# Patient Record
Sex: FEMALE | Race: WHITE | NOT HISPANIC OR LATINO | Employment: UNEMPLOYED | URBAN - METROPOLITAN AREA
[De-identification: names, ages, dates, MRNs, and addresses within clinical notes are randomized per-mention and may not be internally consistent; named-entity substitution may affect disease eponyms.]

---

## 2017-10-24 ENCOUNTER — CONVERSION ENCOUNTER (OUTPATIENT)
Dept: GENERAL RADIOLOGY | Facility: HOSPITAL | Age: 62
End: 2017-10-24

## 2018-05-23 ENCOUNTER — OFFICE VISIT CONVERTED (OUTPATIENT)
Dept: ORTHOPEDIC SURGERY | Facility: CLINIC | Age: 63
End: 2018-05-23
Attending: ORTHOPAEDIC SURGERY

## 2018-07-25 ENCOUNTER — OFFICE VISIT CONVERTED (OUTPATIENT)
Dept: ORTHOPEDIC SURGERY | Facility: CLINIC | Age: 63
End: 2018-07-25
Attending: ORTHOPAEDIC SURGERY

## 2019-03-06 ENCOUNTER — HOSPITAL ENCOUNTER (OUTPATIENT)
Dept: GENERAL RADIOLOGY | Facility: HOSPITAL | Age: 64
Discharge: HOME | End: 2019-03-06
Attending: FAMILY MEDICINE

## 2019-11-25 ENCOUNTER — HOSPITAL ENCOUNTER (OUTPATIENT)
Dept: URGENT CARE | Facility: CLINIC | Age: 64
Discharge: HOME | End: 2019-11-25

## 2020-03-04 ENCOUNTER — HOSPITAL ENCOUNTER (OUTPATIENT)
Dept: GENERAL RADIOLOGY | Facility: HOSPITAL | Age: 65
Discharge: HOME | End: 2020-03-04
Attending: FAMILY MEDICINE

## 2020-06-30 ENCOUNTER — HOSPITAL ENCOUNTER (OUTPATIENT)
Dept: LAB | Facility: HOSPITAL | Age: 65
Discharge: HOME | End: 2020-06-30
Attending: FAMILY MEDICINE

## 2020-06-30 LAB
ALBUMIN SERPL-MCNC: 4.4 G/DL (ref 3.5–5)
ALBUMIN/GLOB SERPL: 2 {RATIO} (ref 1.4–2.6)
ALP SERPL-CCNC: 60 U/L (ref 43–160)
ALT SERPL-CCNC: 25 U/L (ref 10–40)
ANION GAP SERPL CALC-SCNC: 19 MMOL/L (ref 8–19)
AST SERPL-CCNC: 14 U/L (ref 15–50)
BASOPHILS # BLD AUTO: 0.02 10*3/UL (ref 0–0.2)
BASOPHILS NFR BLD AUTO: 0.4 % (ref 0–3)
BILIRUB SERPL-MCNC: 0.44 MG/DL (ref 0.2–1.3)
BUN SERPL-MCNC: 12 MG/DL (ref 5–25)
BUN/CREAT SERPL: 21 {RATIO} (ref 6–20)
CALCIUM SERPL-MCNC: 9.2 MG/DL (ref 8.7–10.4)
CHLORIDE SERPL-SCNC: 98 MMOL/L (ref 99–111)
CK SERPL-CCNC: 78 U/L (ref 35–230)
CONV ABS IMM GRAN: 0.01 10*3/UL (ref 0–0.2)
CONV CO2: 24 MMOL/L (ref 22–32)
CONV IMMATURE GRAN: 0.2 % (ref 0–1.8)
CONV TOTAL PROTEIN: 6.6 G/DL (ref 6.3–8.2)
CREAT UR-MCNC: 0.56 MG/DL (ref 0.5–0.9)
DEPRECATED RDW RBC AUTO: 41.2 FL (ref 36.4–46.3)
EOSINOPHIL # BLD AUTO: 0.07 10*3/UL (ref 0–0.7)
EOSINOPHIL # BLD AUTO: 1.4 % (ref 0–7)
ERYTHROCYTE [DISTWIDTH] IN BLOOD BY AUTOMATED COUNT: 12 % (ref 11.7–14.4)
GFR SERPLBLD BASED ON 1.73 SQ M-ARVRAT: >60 ML/MIN/{1.73_M2}
GLOBULIN UR ELPH-MCNC: 2.2 G/DL (ref 2–3.5)
GLUCOSE SERPL-MCNC: 95 MG/DL (ref 65–99)
HCT VFR BLD AUTO: 40.2 % (ref 37–47)
HGB BLD-MCNC: 12.9 G/DL (ref 12–16)
INR PPP: 0.95 (ref 2–3)
LYMPHOCYTES # BLD AUTO: 1.58 10*3/UL (ref 1–5)
LYMPHOCYTES NFR BLD AUTO: 32.6 % (ref 20–45)
MAGNESIUM SERPL-MCNC: 2.2 MG/DL (ref 1.6–2.3)
MCH RBC QN AUTO: 30.4 PG (ref 27–31)
MCHC RBC AUTO-ENTMCNC: 32.1 G/DL (ref 33–37)
MCV RBC AUTO: 94.6 FL (ref 81–99)
MONOCYTES # BLD AUTO: 0.49 10*3/UL (ref 0.2–1.2)
MONOCYTES NFR BLD AUTO: 10.1 % (ref 3–10)
NEUTROPHILS # BLD AUTO: 2.68 10*3/UL (ref 2–8)
NEUTROPHILS NFR BLD AUTO: 55.3 % (ref 30–85)
NRBC CBCN: 0 % (ref 0–0.7)
OSMOLALITY SERPL CALC.SUM OF ELEC: 284 MOSM/KG (ref 273–304)
PLATELET # BLD AUTO: 237 10*3/UL (ref 130–400)
PMV BLD AUTO: 10.6 FL (ref 9.4–12.3)
POTASSIUM SERPL-SCNC: 4.4 MMOL/L (ref 3.5–5.3)
PROTHROMBIN TIME: 10.4 S (ref 9.4–12)
RBC # BLD AUTO: 4.25 10*6/UL (ref 4.2–5.4)
SODIUM SERPL-SCNC: 137 MMOL/L (ref 135–147)
TSH SERPL-ACNC: 3.63 M[IU]/L (ref 0.27–4.2)
WBC # BLD AUTO: 4.85 10*3/UL (ref 4.8–10.8)

## 2020-07-01 LAB — 25(OH)D3 SERPL-MCNC: 71.3 NG/ML (ref 30–100)

## 2021-05-16 VITALS — HEIGHT: 63 IN | BODY MASS INDEX: 28.35 KG/M2 | WEIGHT: 160 LBS | RESPIRATION RATE: 18 BRPM

## 2021-05-16 VITALS — HEIGHT: 63 IN | BODY MASS INDEX: 28.35 KG/M2 | RESPIRATION RATE: 16 BRPM | WEIGHT: 160 LBS

## 2021-05-22 ENCOUNTER — TRANSCRIBE ORDERS (OUTPATIENT)
Dept: ADMINISTRATIVE | Facility: HOSPITAL | Age: 66
End: 2021-05-22

## 2021-05-22 DIAGNOSIS — Z12.39 SCREENING BREAST EXAMINATION: Primary | ICD-10-CM

## 2021-05-22 DIAGNOSIS — Z78.0 POST-MENOPAUSAL: ICD-10-CM

## 2021-07-16 ENCOUNTER — HOSPITAL ENCOUNTER (OUTPATIENT)
Dept: MAMMOGRAPHY | Facility: HOSPITAL | Age: 66
Discharge: HOME | End: 2021-07-16
Admitting: FAMILY MEDICINE

## 2021-07-16 DIAGNOSIS — Z12.39 SCREENING BREAST EXAMINATION: ICD-10-CM

## 2021-07-16 PROCEDURE — 77063 BREAST TOMOSYNTHESIS BI: CPT

## 2021-07-16 PROCEDURE — 77067 SCR MAMMO BI INCL CAD: CPT

## 2021-09-22 ENCOUNTER — HOSPITAL ENCOUNTER (OUTPATIENT)
Dept: BONE DENSITY | Facility: HOSPITAL | Age: 66
Discharge: HOME | End: 2021-09-22
Admitting: FAMILY MEDICINE

## 2021-09-22 DIAGNOSIS — Z78.0 POST-MENOPAUSAL: ICD-10-CM

## 2021-09-22 DIAGNOSIS — Z12.39 SCREENING BREAST EXAMINATION: ICD-10-CM

## 2021-09-22 PROCEDURE — 77080 DXA BONE DENSITY AXIAL: CPT

## 2022-04-28 ENCOUNTER — TRANSCRIBE ORDERS (OUTPATIENT)
Dept: ADMINISTRATIVE | Facility: HOSPITAL | Age: 67
End: 2022-04-28

## 2022-04-28 DIAGNOSIS — Z12.31 VISIT FOR SCREENING MAMMOGRAM: Primary | ICD-10-CM

## 2022-07-18 ENCOUNTER — HOSPITAL ENCOUNTER (OUTPATIENT)
Dept: MAMMOGRAPHY | Facility: HOSPITAL | Age: 67
Discharge: HOME | End: 2022-07-18
Admitting: NURSE PRACTITIONER

## 2022-07-18 DIAGNOSIS — Z12.31 VISIT FOR SCREENING MAMMOGRAM: ICD-10-CM

## 2022-07-18 PROCEDURE — 77067 SCR MAMMO BI INCL CAD: CPT

## 2022-07-18 PROCEDURE — 77063 BREAST TOMOSYNTHESIS BI: CPT

## 2022-12-19 ENCOUNTER — TELEPHONE (OUTPATIENT)
Dept: ORTHOPEDIC SURGERY | Facility: CLINIC | Age: 67
End: 2022-12-19

## 2022-12-19 NOTE — TELEPHONE ENCOUNTER
INCOMING NEW PT URGENT REFERRAL FROM JUSTIN LANCASTER TO [facility] [place] FOR NONDISPLACED FRACTURE OF THE GREATER TUBEROSITY OF THE HUMERUS-LEFT. INDEXED- REF / INS INFO 12.19.22, DEMO, PN / XR SHOULDER 2 OR MORE V / XR SPINE 2OR3 V / XR CLAVICLE / XR RIBS UNI W 1V CHEST 12.16.22,

## 2022-12-28 ENCOUNTER — OFFICE VISIT (OUTPATIENT)
Dept: ORTHOPEDIC SURGERY | Facility: CLINIC | Age: 67
End: 2022-12-28

## 2022-12-28 VITALS — BODY MASS INDEX: 26.93 KG/M2 | OXYGEN SATURATION: 97 % | WEIGHT: 152 LBS | HEART RATE: 75 BPM | HEIGHT: 63 IN

## 2022-12-28 DIAGNOSIS — M25.512 LEFT SHOULDER PAIN, UNSPECIFIED CHRONICITY: Primary | ICD-10-CM

## 2022-12-28 DIAGNOSIS — S42.202A CLOSED FRACTURE OF PROXIMAL END OF LEFT HUMERUS, UNSPECIFIED FRACTURE MORPHOLOGY, INITIAL ENCOUNTER: ICD-10-CM

## 2022-12-28 PROCEDURE — 99203 OFFICE O/P NEW LOW 30 MIN: CPT | Performed by: STUDENT IN AN ORGANIZED HEALTH CARE EDUCATION/TRAINING PROGRAM

## 2022-12-28 RX ORDER — OMEPRAZOLE 20 MG/1
CAPSULE, DELAYED RELEASE ORAL
COMMUNITY

## 2022-12-28 RX ORDER — CETIRIZINE HYDROCHLORIDE 10 MG/1
TABLET ORAL
COMMUNITY

## 2022-12-28 RX ORDER — FLUTICASONE PROPIONATE 50 MCG
SPRAY, SUSPENSION (ML) NASAL
COMMUNITY

## 2022-12-28 RX ORDER — GABAPENTIN 100 MG/1
CAPSULE ORAL
COMMUNITY
Start: 2022-11-30

## 2022-12-28 RX ORDER — LEVOTHYROXINE SODIUM 0.03 MG/1
TABLET ORAL
COMMUNITY

## 2022-12-28 RX ORDER — LOSARTAN POTASSIUM 50 MG/1
TABLET ORAL
COMMUNITY

## 2022-12-28 RX ORDER — GLIPIZIDE 10 MG/1
TABLET ORAL
COMMUNITY

## 2022-12-28 NOTE — PROGRESS NOTES
"Chief Complaint  Initial Evaluation and Pain of the Left Shoulder    Subjective          Madeleine Campos presents to [facility] for   History of Present Illness    The patient presents here today for evaluation of the left shoulder. The patient had a fall about 3 weeks ago and her arm went above her head. She now has left shoulder pain. She was seen and evaluated with x-rays and was placed into a sling.     Allergies   Allergen Reactions   • Chlorthalidone Cough   • Lisinopril Cough        Social History     Socioeconomic History   • Marital status:    Tobacco Use   • Smoking status: Former     Types: Cigarettes   • Smokeless tobacco: Never   Vaping Use   • Vaping Use: Never used        I reviewed the patient's chief complaint, history of present illness, review of systems, past medical history, surgical history, family history, social history, medications, and allergy list.     REVIEW OF SYSTEMS    Constitutional: Denies fevers, chills, weight loss  Cardiovascular: Denies chest pain, shortness of breath  Skin: Denies rashes, acute skin changes  Neurologic: Denies headache, loss of consciousness  MSK: Left shoulder pain      Objective   Vital Signs:   Pulse 75   Ht 160 cm (63\")   Wt 68.9 kg (152 lb)   SpO2 97%   BMI 26.93 kg/m²     Body mass index is 26.93 kg/m².    Physical Exam    General: Alert. No acute distress.   Left shoulder- Bruising resolved. No swelling. Tender to the proximal humerus. Neurovascularly intact. Active Forward elevation 60. Passive 90. External Rotation 45. Axillary sensation intact. Sensation to light touch median, radial, ulnar nerve. Positive AIN, PIN, ulnar nerve motor function. Positive pulses.     Procedures    Imaging Results (Most Recent)     Procedure Component Value Units Date/Time    XR Shoulder 2+ View Left [[id number]] Resulted: 12/28/22 1225     Updated: 12/28/22 1226    Narrative:      Indications: Left greater tuberosity " fracture    Views: AP, Grashey, Scap Y left shoulder    Findings: Nondisplaced greater tuberosity fracture appears stable.    Glenohumeral joint reduced.  No additional fracture seen.    Comparative Data: Comparative data found and reviewed today                     Assessment and Plan        XR Shoulder 2+ View Left    Result Date: 12/28/2022  Narrative: Indications: Left greater tuberosity fracture Views: AP, Grashey, Scap Y left shoulder Findings: Nondisplaced greater tuberosity fracture appears stable.  Glenohumeral joint reduced.  No additional fracture seen. Comparative Data: Comparative data found and reviewed today        Diagnoses and all orders for this visit:    1. Left shoulder pain, unspecified chronicity (Primary)  -     XR Shoulder 2+ View Left    2. Closed fracture of proximal end of left humerus, unspecified fracture morphology, initial encounter        Discussed the treatment plan with the patient.  I reviewed the x-rays that were obtained today with the patient. Plan for conservative treatment. She can wean out of the sling. Home exercises demonstrated in the office today. The patient expressed understanding.       Will obtain X-Rays of Left shoulder at next visit.     Call or return if worsening symptoms.    Scribed for Thanh Romero MD by Arely Sears  12/28/2022   10:26 EST         Follow Up       2 weeks    Patient was given instructions and counseling regarding her condition or for health maintenance advice. Please see specific information pulled into the AVS if appropriate.       I have personally performed the services described in this document as scribed by the above individual and it is both accurate and complete.     Thanh Romero MD  12/28/22  15:48 EST

## 2023-01-04 ENCOUNTER — TELEPHONE (OUTPATIENT)
Dept: ORTHOPEDIC SURGERY | Facility: CLINIC | Age: 68
End: 2023-01-04
Payer: MEDICARE

## 2023-01-04 NOTE — TELEPHONE ENCOUNTER
PATIENT ADVISED DR. MOSS WILL NOT PRESCRIBE A MUSCLE RELAXER AT THIS TIME WILL RE-EVALUATE AT FOLLOW UP VISIT.

## 2023-01-04 NOTE — TELEPHONE ENCOUNTER
PT HAS APPT ON 1/11 W/ISA BUT WANT HIM TO PRESCRIBE MUSCLE RELAXER'S FOR HER UNTIL SHE COME IN TO SEE HIM ON 1/11. PHARMACY IS [phone]. PT CAN BE REACHED [phone]

## 2023-01-11 ENCOUNTER — OFFICE VISIT (OUTPATIENT)
Dept: ORTHOPEDIC SURGERY | Facility: CLINIC | Age: 68
End: 2023-01-11
Payer: MEDICARE

## 2023-01-11 VITALS — WEIGHT: 152 LBS | OXYGEN SATURATION: 98 % | HEIGHT: 63 IN | BODY MASS INDEX: 26.93 KG/M2 | HEART RATE: 70 BPM

## 2023-01-11 DIAGNOSIS — M25.512 LEFT SHOULDER PAIN, UNSPECIFIED CHRONICITY: Primary | ICD-10-CM

## 2023-01-11 DIAGNOSIS — S42.202A CLOSED FRACTURE OF PROXIMAL END OF LEFT HUMERUS, UNSPECIFIED FRACTURE MORPHOLOGY, INITIAL ENCOUNTER: ICD-10-CM

## 2023-01-11 PROCEDURE — 99213 OFFICE O/P EST LOW 20 MIN: CPT | Performed by: STUDENT IN AN ORGANIZED HEALTH CARE EDUCATION/TRAINING PROGRAM

## 2023-01-11 RX ORDER — CYCLOBENZAPRINE HCL 10 MG
10 TABLET ORAL
COMMUNITY
Start: 2023-01-04

## 2023-01-11 NOTE — PROGRESS NOTES
"Chief Complaint  Follow-up of the Left Shoulder    Subjective          Madeleine Campos presents to [facility] for   History of Present Illness    The patient presents here today for follow up evaluation of the left shoulder. The patient has a proximal humerus fracture that is being treated conservatively. She is about 5 weeks out from her injury. She is still having some pain.     Allergies   Allergen Reactions   • Chlorthalidone Cough   • Lisinopril Cough        Social History     Socioeconomic History   • Marital status:    Tobacco Use   • Smoking status: Former     Types: Cigarettes   • Smokeless tobacco: Never   Vaping Use   • Vaping Use: Never used        I reviewed the patient's chief complaint, history of present illness, review of systems, past medical history, surgical history, family history, social history, medications, and allergy list.     REVIEW OF SYSTEMS    Constitutional: Denies fevers, chills, weight loss  Cardiovascular: Denies chest pain, shortness of breath  Skin: Denies rashes, acute skin changes  Neurologic: Denies headache, loss of consciousness  MSK: Left shoulder pain      Objective   Vital Signs:   Pulse 70   Ht 160 cm (63\")   Wt 68.9 kg (152 lb)   SpO2 98%   BMI 26.93 kg/m²     Body mass index is 26.93 kg/m².    Physical Exam    General: Alert. No acute distress.   Left upper extremity- Tender to deltoid laterally. Non-tender to acromioclavicular joint. Forward elevation active 60 degrees. Passive 90 degrees. External Rotation 30. Internal rotation waistline. Neurovascularly intact. Full elbow ROM. Axillary nerve intact Positive EHL, FHL, GS and TA. Sensation intact to all 5 nerves of the foot. Positive pulses.     Procedures    Imaging Results (Most Recent)     Procedure Component Value Units Date/Time    XR Scapula Left [[id number]] Resulted: 01/11/23 0819     Updated: 01/11/23 0821    Narrative:      Indications: Follow-up left proximal " humerus fracture    Views: AP, Scap Y left shoulder    Findings: Nondisplaced greater tuberosity fracture again seen.  Fracture   alignment stable.  Callus forming across the fracture site.  Glenohumeral   joint reduced.  Mild glenohumeral arthritic changes are stable.    Comparative Data: Comparative data found and reviewed today                     Assessment and Plan        XR Scapula Left    Result Date: 1/11/2023  Narrative: Indications: Follow-up left proximal humerus fracture Views: AP, Scap Y left shoulder Findings: Nondisplaced greater tuberosity fracture again seen.  Fracture alignment stable.  Callus forming across the fracture site.  Glenohumeral joint reduced.  Mild glenohumeral arthritic changes are stable. Comparative Data: Comparative data found and reviewed today     XR Shoulder 2+ View Left    Result Date: 12/28/2022  Narrative: Indications: Left greater tuberosity fracture Views: AP, Grashey, Scap Y left shoulder Findings: Nondisplaced greater tuberosity fracture appears stable.  Glenohumeral joint reduced.  No additional fracture seen. Comparative Data: Comparative data found and reviewed today        Diagnoses and all orders for this visit:    1. Left shoulder pain, unspecified chronicity (Primary)  -     XR Scapula Left  -     diclofenac (VOLTAREN) 50 MG EC tablet; Take 1 tablet by mouth 2 (Two) Times a Day.  Dispense: 60 tablet; Refill: 0    2. Closed fracture of proximal end of left humerus, unspecified fracture morphology, initial encounter  -     Ambulatory Referral to Physical Therapy Evaluate and treat, Ortho; Stretching, ROM, Strengthening; Partial weight bearing (5 pounds)  -     diclofenac (VOLTAREN) 50 MG EC tablet; Take 1 tablet by mouth 2 (Two) Times a Day.  Dispense: 60 tablet; Refill: 0        Discussed the treatment plan with the patient.  I reviewed the x-rays that were obtained today with the patient. Order for physical/occupational therapy given today. I advised her to work on  ROM at home as well. I advised her no heavy lifting or reaching away from the body. She can continue ice, heat and medication for the pain. Prescription for Voltaren given today. She can use topicals as needed.       Will obtain X-Rays of Left shoulder at next visit.     Call or return if worsening symptoms.    Scribed for Thanh Romero MD by Arely Sears  01/11/2023   08:06 EST         Follow Up       4 weeks    Patient was given instructions and counseling regarding her condition or for health maintenance advice. Please see specific information pulled into the AVS if appropriate.       I have personally performed the services described in this document as scribed by the above individual and it is both accurate and complete.     Thanh Romero MD  01/11/23  08:21 EST

## 2023-01-13 ENCOUNTER — TELEPHONE (OUTPATIENT)
Dept: ORTHOPEDIC SURGERY | Facility: CLINIC | Age: 68
End: 2023-01-13

## 2023-01-13 NOTE — TELEPHONE ENCOUNTER
Caller: Madeleine Campos    Relationship: Self    Best call back number: [phone]    What is the medical concern/diagnosis: LEFT SHOULDER    What specialty or service is being requested: PHYSICAL THERAPY    What is the provider, practice or medical service name: [facility]    What is the office location: [address] [place] [address]    What is the office phone number: [phone]    Any additional details: THIS OFFICE IS CLOSER TO THE PATIENT'S HOME

## 2023-01-24 ENCOUNTER — TREATMENT (OUTPATIENT)
Dept: PHYSICAL THERAPY | Facility: CLINIC | Age: 68
End: 2023-01-24
Payer: MEDICARE

## 2023-01-24 DIAGNOSIS — S42.412A CLOSED SUPRACONDYLAR FRACTURE OF LEFT HUMERUS, INITIAL ENCOUNTER: Primary | ICD-10-CM

## 2023-01-24 DIAGNOSIS — M25.612 POST-TRAUMATIC STIFFNESS OF LEFT SHOULDER JOINT: ICD-10-CM

## 2023-01-24 DIAGNOSIS — M25.512 ACUTE PAIN OF LEFT SHOULDER: ICD-10-CM

## 2023-01-24 PROCEDURE — 97165 OT EVAL LOW COMPLEX 30 MIN: CPT | Performed by: OCCUPATIONAL THERAPIST

## 2023-01-24 NOTE — PROGRESS NOTES
"Occupational Therapy Initial Evaluation and Plan of Care  [address]   [place]  [address]      Patient: Madeleine Campos   : 1955  Diagnosis/ICD-10 Code:  Closed supracondylar fracture of left humerus, initial encounter [S42.412A]  Referring practitioner: Thanh Romero MD  Date of Initial Visit: 2023  Today's Date: 2023  Patient seen for 1 sessions               Subjective Evaluation    History of Present Illness  Mechanism of injury: Patient reports that she tripped and fell injuring her L shoulder in mid December.  She suffered a L proximal humerus fracture. She is now approx one month s/p injury and is referred to therapy for evaluation and treatment.    22 Xray reveals non-displaced greater tuberosity fracture    PMHx includes HBP and thyroid condition      Patient Occupation: homemaker Pain  Current pain ratin  At best pain ratin  At worst pain rating: 10  Quality: dull ache, sharp, discomfort and throbbing  Relieving factors: heat and ice  Aggravating factors: movement and sleeping  Progression: no change    Social Support  Lives with: significant other    Hand dominance: right    Diagnostic Tests  X-ray: abnormal    Patient Goals  Patient goals for therapy: decreased pain, increased motion, increased strength, independence with ADLs/IADLs and return to sport/leisure activities  Patient goal: \"to be able to get back to normal and not have this pain\"         Quick Dash 35/55 40-59% functional limitation    Objective          Postural Observations  Seated posture: fair  Standing posture: good        Tenderness     Additional Tenderness Details  Reports moderate to severe tenderness in her L upper arm    Cervical/Thoracic Screen   Cervical range of motion within normal limits    Neurological Testing     Sensation     Shoulder   Left Shoulder   Paresthesia: light touch    Additional Neurological Details  Patient reports that when pain is really bad it sends " tingling down her arm into her hand.    Active Range of Motion   Left Shoulder   Flexion: 100 degrees   Abduction: 80 degrees   External rotation BTH: C2   Internal rotation BTB: L5     Passive Range of Motion   Left Shoulder   Flexion: 120 degrees with pain  Abduction: 90 degrees with pain  External rotation 45°: 60 degrees with pain  Internal rotation 45°: 60 degrees with pain    Additional Passive Range of Motion Details  Patient is guarded with attempts at PROM    Strength/Myotome Testing     Additional Strength Details  Deferred at this time.          Assessment & Plan     Assessment  Impairments: abnormal coordination, abnormal muscle tone, abnormal or restricted ROM, activity intolerance, impaired physical strength, lacks appropriate home exercise program and pain with function  Functional Limitations: carrying objects, lifting, sleeping, pulling, pushing, uncomfortable because of pain, reaching behind back, reaching overhead and unable to perform repetitive tasksPrognosis: good    Goals  Plan Goals:  Shoulder Pain  LTG 1  12 weeks:  Decrease pain to 1/10 to improve sleep and ADL performance  Status:  New  STG 1  6 weeks:  Decrease pain to 4/10  Status:  New    2.  Decreased shoulder AROM  LTG 2  12 weeks:  Increase shoulder AROM to 150 degrees with good functional reach into internal/external rotation to improve reach  Status: New  STG 2  6 weeks:  Increase shoulder AROM to 120 degrees  Status:  New    3.  Decreased shoulder strength  LTG 3  12 weeks:  Increase shoulder strength to 5/5 MMT to improve ability to lift, carry and handle objects  Status:  New  STG 3  6 weeks:  Good tolerance to strength testing  Status:  New    4.  Decreased functional use of the upper extremity  LTG 4  12 weeks:  Improve function score to 19/55 or better on Quick Dash   Status:  New  STG 4  6 weeks:  Improve function score to 27/55 or better on Quick Dash  Status:  New    Plan  Planned modality interventions: iontophoresis,  contrast bath immersion, cryotherapy, electrical stimulation/Russian stimulation, hydrotherapy, TENS, thermotherapy (hydrocollator packs), thermotherapy (paraffin bath) and ultrasound  Planned therapy interventions: ADL retraining, balance/weight-bearing training, body mechanics training, compression, fine motor coordination training, flexibility, manual therapy, neuromuscular re-education, motor coordination training, orthotic fitting/training, postural training, soft tissue mobilization, spinal/joint mobilization, strengthening, stretching, IADL retraining, home exercise program and functional ROM exercises  Frequency: 2x week  Duration in weeks: 12  Treatment plan discussed with: patient  Plan details: Reviewed use of modalities, instructed in postural correction, pendulums and AAROM exercises.        Patient will be seen for skilled OT services    Visit Diagnoses:    ICD-10-CM ICD-9-CM   1. Closed supracondylar fracture of left humerus, initial encounter  S42.412A 812.41   2. Acute pain of left shoulder  M25.512 719.41   3. Post-traumatic stiffness of left shoulder joint  M25.612 719.51       Timed:  Manual Therapy:                 0     mins  87699  Therapeutic Exercise:      0     mins  45495     Neuromuscular reeducation       0     mins 93350  Therapeutic Activity              0     mins  86057  Ultrasound:                  0     mins  39032     Untimed:  Low eval:                       50     mins  66809  Mod eval                        0     mins  53997  Electrical Stimulation:    0     mins  73112 ( );  Fuidotherapy     0     mins  39074      Timed Treatment:   0   mins   Total Treatment:     50   mins    OT SIGNATURE: Dilshad Pierson OT, CHT  Electronic Signature  [place] license #: [id number]      Initial Certification  Certification Period: 1/24/2023 thru 4/23/2023  I certify that the therapy services are furnished while this patient is under my care.  The services outlined above are required by this  patient, and will be reviewed every 90 days.     PHYSICIAN: Thanh Romero MD   NPI: [id number]     DATE:       Please sign and return via fax to [phone].. Thank you, [facility].

## 2023-01-27 ENCOUNTER — TREATMENT (OUTPATIENT)
Dept: PHYSICAL THERAPY | Facility: CLINIC | Age: 68
End: 2023-01-27
Payer: MEDICARE

## 2023-01-27 DIAGNOSIS — M25.512 ACUTE PAIN OF LEFT SHOULDER: ICD-10-CM

## 2023-01-27 DIAGNOSIS — M25.612 POST-TRAUMATIC STIFFNESS OF LEFT SHOULDER JOINT: ICD-10-CM

## 2023-01-27 DIAGNOSIS — S42.412A CLOSED SUPRACONDYLAR FRACTURE OF LEFT HUMERUS, INITIAL ENCOUNTER: Primary | ICD-10-CM

## 2023-01-27 PROCEDURE — 97530 THERAPEUTIC ACTIVITIES: CPT | Performed by: OCCUPATIONAL THERAPIST

## 2023-01-27 PROCEDURE — 97110 THERAPEUTIC EXERCISES: CPT | Performed by: OCCUPATIONAL THERAPIST

## 2023-01-27 NOTE — PROGRESS NOTES
Occupational Therapy Daily Treatment Note  Michael OT: [address] [place] [address]      Patient: Madeleine Campos   : 1955  Diagnosis/ICD-10 Code:  Closed supracondylar fracture of left humerus, initial encounter [S42.412A]  Referring practitioner: No ref. provider found  Date of Initial Visit: Type: THERAPY  Noted: 2023  Today's Date: 2023  Patient seen for 2 sessions             Subjective   Madeleine Campos reports: 9-10/10 pain L shoulder. Pt reports it started hurting worse last night.    Objective     See Exercise, Manual, and Modality Logs for complete treatment.       Assessment/Plan  Pt tolerated well and reports a decrease in pain at end of session.  Progress per Plan of Care           Timed:  Therapeutic Exercise:    15     mins  54832;      Therapeutic Activity:     8     mins  46920;       Timed Treatment:   23   mins   Total Treatment:     23   mins        Rojelio Ortega OT  Occupational Therapist  [place] License: [id number]  NPI: [id number]

## 2023-02-03 ENCOUNTER — TREATMENT (OUTPATIENT)
Dept: PHYSICAL THERAPY | Facility: CLINIC | Age: 68
End: 2023-02-03
Payer: MEDICARE

## 2023-02-03 DIAGNOSIS — M25.612 POST-TRAUMATIC STIFFNESS OF LEFT SHOULDER JOINT: ICD-10-CM

## 2023-02-03 DIAGNOSIS — S42.412A CLOSED SUPRACONDYLAR FRACTURE OF LEFT HUMERUS, INITIAL ENCOUNTER: Primary | ICD-10-CM

## 2023-02-03 DIAGNOSIS — M25.512 ACUTE PAIN OF LEFT SHOULDER: ICD-10-CM

## 2023-02-03 PROCEDURE — 97530 THERAPEUTIC ACTIVITIES: CPT | Performed by: OCCUPATIONAL THERAPIST

## 2023-02-03 PROCEDURE — 97110 THERAPEUTIC EXERCISES: CPT | Performed by: OCCUPATIONAL THERAPIST

## 2023-02-03 NOTE — PROGRESS NOTES
Occupational Therapy Daily Treatment Note  Michael OT: [address] [place] [address]      Patient: Madeleine Campos   : 1955  Diagnosis/ICD-10 Code:  Closed supracondylar fracture of left humerus, initial encounter [S42.412A]  Referring practitioner: No ref. provider found  Date of Initial Visit: Type: THERAPY  Noted: 2023  Today's Date: 2/3/2023  Patient seen for 3 sessions             Subjective   Madeleine Campos reports: 5/10 pain L shoulder. Pt reports her shoulder is doing better and she has been sleeping better.    Objective     See Exercise, Manual, and Modality Logs for complete treatment.       Assessment/Plan  Pt tolerated well. Pt displays improved A/AAROM this date.  Progress per Plan of Care           Timed:  Therapeutic Exercise:    15     mins  49357;     Therapeutic Activity:     8     mins  51415;       Timed Treatment:   23   mins   Total Treatment:     23   mins        Rojelio Ortega OT  Occupational Therapist  [place] License: [id number]  NPI: [id number]

## 2023-02-07 ENCOUNTER — TREATMENT (OUTPATIENT)
Dept: PHYSICAL THERAPY | Facility: CLINIC | Age: 68
End: 2023-02-07
Payer: MEDICARE

## 2023-02-07 DIAGNOSIS — M25.612 POST-TRAUMATIC STIFFNESS OF LEFT SHOULDER JOINT: ICD-10-CM

## 2023-02-07 DIAGNOSIS — M25.512 ACUTE PAIN OF LEFT SHOULDER: ICD-10-CM

## 2023-02-07 DIAGNOSIS — S42.412A CLOSED SUPRACONDYLAR FRACTURE OF LEFT HUMERUS, INITIAL ENCOUNTER: Primary | ICD-10-CM

## 2023-02-07 PROCEDURE — 97110 THERAPEUTIC EXERCISES: CPT | Performed by: OCCUPATIONAL THERAPIST

## 2023-02-07 PROCEDURE — 97530 THERAPEUTIC ACTIVITIES: CPT | Performed by: OCCUPATIONAL THERAPIST

## 2023-02-07 NOTE — PROGRESS NOTES
"Occupational Therapy Daily Treatment Note  [address]   [place]  [address]      Patient: Madeleine Campos   : 1955  Referring practitioner: No ref. provider found  Date of Initial Visit: Type: THERAPY  Noted: 2023  Today's Date: 2023  Patient seen for 4 sessions           Subjective Evaluation    History of Present Illness  Mechanism of injury:  L proximal humerus fracture mid 2022.     22 Xray reveals non-displaced greater tuberosity fracture    PMHx includes HBP and thyroid condition    Subjective comment: \"I am doing and feeling much better\"  Patient Occupation: homemaker Pain  Current pain ratin    Patient Goals  Patient goal: \"to be able to get back to normal and not have this pain\"           Objective   See Exercise, Manual, and Modality Logs for complete treatment.       Assessment & Plan     Assessment    Assessment details: Good tolerance to exercises.  Abduction continues moderately limited but pain level has decreased.  Functional use of L UE has improved.  Continue with exercises.        Visit Diagnoses:    ICD-10-CM ICD-9-CM   1. Closed supracondylar fracture of left humerus, initial encounter  S42.412A 812.41   2. Acute pain of left shoulder  M25.512 719.41   3. Post-traumatic stiffness of left shoulder joint  M25.612 719.51       Progress strengthening /stabilization /functional activity           Timed:  Manual Therapy:                 0     mins  82029  Therapeutic Exercise:      20     mins  71908     Neuromuscular reeducation       0     mins 24472  Therapeutic Activity              10     mins  66934  Ultrasound:                  0     mins  18955     Untimed:  Electrical Stimulation:    0     mins  62333 ( );  Fluidotherapy      0     mins  18351    Timed Treatment:   30   mins   Total Treatment:     30   mins    Dilshad Pierson OT, MIMIT  Occupational Therapist    Electronically signed      [place] license #: [id number]  "

## 2023-02-10 ENCOUNTER — TREATMENT (OUTPATIENT)
Dept: PHYSICAL THERAPY | Facility: CLINIC | Age: 68
End: 2023-02-10
Payer: MEDICARE

## 2023-02-10 DIAGNOSIS — S42.412A CLOSED SUPRACONDYLAR FRACTURE OF LEFT HUMERUS, INITIAL ENCOUNTER: Primary | ICD-10-CM

## 2023-02-10 DIAGNOSIS — M25.612 POST-TRAUMATIC STIFFNESS OF LEFT SHOULDER JOINT: ICD-10-CM

## 2023-02-10 DIAGNOSIS — M25.512 ACUTE PAIN OF LEFT SHOULDER: ICD-10-CM

## 2023-02-10 PROCEDURE — 97110 THERAPEUTIC EXERCISES: CPT | Performed by: OCCUPATIONAL THERAPIST

## 2023-02-10 PROCEDURE — 97530 THERAPEUTIC ACTIVITIES: CPT | Performed by: OCCUPATIONAL THERAPIST

## 2023-02-10 NOTE — PROGRESS NOTES
Occupational Therapy Daily Treatment Note  Michael OT: [address] [place] [address]      Patient: Madeleine Campos   : 1955  Diagnosis/ICD-10 Code:  Closed supracondylar fracture of left humerus, initial encounter [S42.412A]  Referring practitioner: Thanh Romero MD  Date of Initial Visit: Type: THERAPY  Noted: 2023  Today's Date: 2/10/2023  Patient seen for 5 sessions             Subjective   Madeleine Campos reports: 7/10 pain L upper arm. Pt reports pain increases at night and with some home exercises. OT encouraged pt to grade exercises down to pain free if possible. If exercises cannot be completed pain free pt was instructed to put those on hold at this time. Pt verbalizes understanding.    Objective     See Exercise, Manual, and Modality Logs for complete treatment.       Assessment/Plan  OT provided verbal cues for slow, smooth completion of exercises. Pt reports improved comfort with all exercises. OT provided AROM HEP. Teachback successful. Pt tolerated treatment well this date and displays improved ROM.  Progress per Plan of Care           Timed:  Therapeutic Exercise:    28     mins  29524;      Therapeutic Activity:     10     mins  10791;       Timed Treatment:   38   mins   Total Treatment:     38   mins        Rojelio Ortega OT  Occupational Therapist  [place] License: [id number]  NPI: [id number]

## 2023-02-14 ENCOUNTER — TREATMENT (OUTPATIENT)
Dept: PHYSICAL THERAPY | Facility: CLINIC | Age: 68
End: 2023-02-14
Payer: MEDICARE

## 2023-02-14 DIAGNOSIS — M25.612 POST-TRAUMATIC STIFFNESS OF LEFT SHOULDER JOINT: ICD-10-CM

## 2023-02-14 DIAGNOSIS — S42.412A CLOSED SUPRACONDYLAR FRACTURE OF LEFT HUMERUS, INITIAL ENCOUNTER: Primary | ICD-10-CM

## 2023-02-14 DIAGNOSIS — M25.512 ACUTE PAIN OF LEFT SHOULDER: ICD-10-CM

## 2023-02-14 PROCEDURE — 97110 THERAPEUTIC EXERCISES: CPT | Performed by: OCCUPATIONAL THERAPIST

## 2023-02-14 PROCEDURE — 97530 THERAPEUTIC ACTIVITIES: CPT | Performed by: OCCUPATIONAL THERAPIST

## 2023-02-14 NOTE — PROGRESS NOTES
"Occupational Therapy Daily Treatment Note  [address]   [place]  [address]      Patient: Madeleine Campos   : 1955  Referring practitioner: Thanh Romero MD  Date of Initial Visit: Type: THERAPY  Noted: 2023  Today's Date: 2023  Patient seen for 6 sessions           Subjective Evaluation    History of Present Illness  Mechanism of injury:  L proximal humerus fracture mid 2022.     22 Xray reveals non-displaced greater tuberosity fracture    PMHx includes HBP and thyroid condition    Subjective comment: \"My arm is doing a little better\"  Patient Occupation: homemaker Pain  Current pain ratin    Patient Goals  Patient goal: \"to be able to get back to normal and not have this pain\"           Objective   See Exercise, Manual, and Modality Logs for complete treatment.       Assessment & Plan     Assessment    Assessment details: Good tolerance to exercises.  Still has pain and difficulty in abduction.    Advised to avoid painful or awkward positions.  Continue with plan of care.          Visit Diagnoses:    ICD-10-CM ICD-9-CM   1. Closed supracondylar fracture of left humerus, initial encounter  S42.412A 812.41   2. Acute pain of left shoulder  M25.512 719.41   3. Post-traumatic stiffness of left shoulder joint  M25.612 719.51       Progress strengthening /stabilization /functional activity           Timed:  Manual Therapy:                 0     mins  88275  Therapeutic Exercise:      20     mins  59666     Neuromuscular reeducation       0     mins 57197  Therapeutic Activity              10     mins  17162  Ultrasound:                  0     mins  27539     Untimed:  Electrical Stimulation:    0     mins  71561 ( );  Fluidotherapy      0     mins  59562    Timed Treatment:   30   mins   Total Treatment:     30   mins    Dilshad Pierson OT, MIMIT  Occupational Therapist    Electronically signed      [place] license #: [id number]  "

## 2023-02-14 NOTE — PROGRESS NOTES
"Chief Complaint  Follow-up and Pain of the Left Shoulder    Subjective          Madeleine Campos presents to [facility]   History of Present Illness    Madeleine Campos presents today for a follow-up of her left shoulder.  Patient has a left proximal humerus fracture that we have been treating conservatively.  Initial injury at the beginning of December 2022.  Today, patient states that she is doing okay.  She is continue with formal physical therapy.  She continues to notice improvements in her range of motion.  She has been taking gabapentin and Flexeril as needed for her pain.  She reports some pain with motion and at night.  She has not yet started strengthening exercises.  She affirms numbness at times.  She states that she takes diclofenac on occasion.  Patient reports some soreness to her forearm with pain that fluctuates at times.      Allergies   Allergen Reactions   • Chlorthalidone Cough   • Lisinopril Cough        Social History     Socioeconomic History   • Marital status:    Tobacco Use   • Smoking status: Former     Types: Cigarettes   • Smokeless tobacco: Never   Vaping Use   • Vaping Use: Never used        I reviewed the patient's chief complaint, history of present illness, review of systems, past medical history, surgical history, family history, social history, medications, and allergy list.     REVIEW OF SYSTEMS    Constitutional: Denies fevers, chills, weight loss  Cardiovascular: Denies chest pain, shortness of breath  Skin: Denies rashes, acute skin changes  Neurologic: Denies headache, loss of consciousness  MSK: Left shoulder pain      Objective   Vital Signs:   Pulse 71   Ht 160 cm (63\")   Wt 68.9 kg (152 lb)   SpO2 97%   BMI 26.93 kg/m²     Body mass index is 26.93 kg/m².    Physical Exam    General: Alert. No acute distress.   Left upper extremity: Nontender to the AC joint.  Nontender to the bicipital groove.  Slight tenderness to the " lateral upper arm.  Active forward elevation 110 degrees.  External rotation 45 degrees.  Internal rotation to the lower lumbar spine.  Full elbow range of motion.  Forearm soft.  Active wrist range of motion.  Full finger flexion and extension.  Thumb opposition intact.  Palmar abduction of the thumb intact.  Sensation intact axillary, median, radial, and ulnar nerve distributions.  Palpable radial pulse.    Procedures    Imaging Results (Most Recent)     Procedure Component Value Units Date/Time    XR Scapula Left [[id number]] Resulted: 02/15/23 0812     Updated: 02/15/23 0812    Narrative:      Indications: Follow-up left proximal humerus fracture    Views: AP, Grashey, and scapular Y left shoulder    Findings: Nondisplaced fracture to the greater tuberosity of the left   shoulder is seen with an increase in callus formation about the fracture.    Fracture alignment is stable.  Glenohumeral joint is reduced.    Comparative Data: Comparative data found and reviewed today.                   Assessment and Plan    Diagnoses and all orders for this visit:    1. Closed fracture of proximal end of left humerus with routine healing, unspecified fracture morphology, subsequent encounter (Primary)    2. Left shoulder pain, unspecified chronicity  -     XR Scapula Left        Madeleine Campos presents today for follow-up of her left proximal humerus fracture that we have been treating conservatively with initial injury at the beginning of December 2022.  X-rays reviewed with the patient today.  Patient is instructed to continue with formal physical therapy as well as her home exercises.  Avoid heavy lifting, pushing, or pulling at this time.  Okay to use Flexeril only as needed.  Continue with Voltaren.  Okay to use topicals as needed.    Patient will follow up in 4 weeks for reevaluation.  We will obtain new x-rays of the left scapula at next visit.      Call or return if symptoms worsen or patient has any concerns.        Follow Up   Return in about 4 weeks (around 3/15/2023).  Patient was given instructions and counseling regarding her condition or for health maintenance advice. Please see specific information pulled into the AVS if appropriate.     Daisy Hauser PA-C  02/15/23  08:38 EST

## 2023-02-15 ENCOUNTER — OFFICE VISIT (OUTPATIENT)
Dept: ORTHOPEDIC SURGERY | Facility: CLINIC | Age: 68
End: 2023-02-15
Payer: MEDICARE

## 2023-02-15 VITALS — BODY MASS INDEX: 26.93 KG/M2 | HEART RATE: 71 BPM | OXYGEN SATURATION: 97 % | HEIGHT: 63 IN | WEIGHT: 152 LBS

## 2023-02-15 DIAGNOSIS — S42.202D CLOSED FRACTURE OF PROXIMAL END OF LEFT HUMERUS WITH ROUTINE HEALING, UNSPECIFIED FRACTURE MORPHOLOGY, SUBSEQUENT ENCOUNTER: Primary | ICD-10-CM

## 2023-02-15 DIAGNOSIS — M25.512 LEFT SHOULDER PAIN, UNSPECIFIED CHRONICITY: ICD-10-CM

## 2023-02-15 PROCEDURE — 99213 OFFICE O/P EST LOW 20 MIN: CPT | Performed by: PHYSICIAN ASSISTANT

## 2023-02-17 ENCOUNTER — TELEPHONE (OUTPATIENT)
Dept: PHYSICAL THERAPY | Facility: CLINIC | Age: 68
End: 2023-02-17

## 2023-02-17 ENCOUNTER — TREATMENT (OUTPATIENT)
Dept: PHYSICAL THERAPY | Facility: CLINIC | Age: 68
End: 2023-02-17
Payer: MEDICARE

## 2023-02-17 DIAGNOSIS — M25.512 ACUTE PAIN OF LEFT SHOULDER: ICD-10-CM

## 2023-02-17 DIAGNOSIS — S42.412A CLOSED SUPRACONDYLAR FRACTURE OF LEFT HUMERUS, INITIAL ENCOUNTER: Primary | ICD-10-CM

## 2023-02-17 DIAGNOSIS — M25.612 POST-TRAUMATIC STIFFNESS OF LEFT SHOULDER JOINT: ICD-10-CM

## 2023-02-17 PROCEDURE — 97110 THERAPEUTIC EXERCISES: CPT | Performed by: OCCUPATIONAL THERAPIST

## 2023-02-17 PROCEDURE — 97530 THERAPEUTIC ACTIVITIES: CPT | Performed by: OCCUPATIONAL THERAPIST

## 2023-02-17 NOTE — PROGRESS NOTES
Occupational Therapy Daily Treatment Note  Michael OT: [address] [place] [address]      Patient: Madeleine Campos   : 1955  Diagnosis/ICD-10 Code:  Closed supracondylar fracture of left humerus, initial encounter [S42.412A]  Referring practitioner: Thanh Romero MD  Date of Initial Visit: Type: THERAPY  Noted: 2023  Today's Date: 2023  Patient seen for 7 sessions             Subjective   Madeleine Campos reports: No current pain. Pt reports her pain at night has been getting better. Pt reports since completing HEP slower she has noticed better tolerance to exercises.    Objective     See Exercise, Manual, and Modality Logs for complete treatment.       Assessment/Plan  Pt displays improved strength/ROM this date. Pt tolerated well.  Progress per Plan of Care           Timed:  Therapeutic Exercise:    28     mins  27271;     Therapeutic Activity:     10     mins  62130;       Timed Treatment:   38   mins   Total Treatment:     38   mins        Rojelio Ortega OT  Occupational Therapist  [place] License: [id number]  NPI: [id number]

## 2023-02-17 NOTE — TELEPHONE ENCOUNTER
Caller: Madeleine Campos    Relationship: Self     What was the call regarding:FEELING WELL AND WANTS TO STOP PT GOING TO DO AT HOME, SHE Wanted to thank you both for getting her to this point. You are awesome!!

## 2023-03-01 ENCOUNTER — DOCUMENTATION (OUTPATIENT)
Dept: PHYSICAL THERAPY | Facility: CLINIC | Age: 68
End: 2023-03-01
Payer: MEDICARE

## 2023-03-01 NOTE — PROGRESS NOTES
Discharge Summary  Discharge Summary from Occupational Therapy Report  [address] [facility], [address]  [place]  [address]      Date: 3/1/23    OT visits: 1/24/23-2/17/23    Number of Visits: 7     Discharge Status of Patient: Patient did not return for any further therapy.    Goals: Not Met    Discharge Plan: Discharge at this time.    Comments Patient cancelled all remaining scheduled appts.  Stated that she was doing well and wants to do her therapy at home.    Last treatment date 2/17/23        Dilshad Pierson OT  Occupational Therapist, Certified Hand Therapist

## 2023-05-09 ENCOUNTER — TRANSCRIBE ORDERS (OUTPATIENT)
Dept: ADMINISTRATIVE | Facility: HOSPITAL | Age: 68
End: 2023-05-09
Payer: MEDICARE

## 2023-05-09 DIAGNOSIS — Z12.39 SCREENING BREAST EXAMINATION: Primary | ICD-10-CM

## 2023-08-17 ENCOUNTER — HOSPITAL ENCOUNTER (OUTPATIENT)
Dept: MAMMOGRAPHY | Facility: HOSPITAL | Age: 68
Discharge: HOME | End: 2023-08-17
Admitting: NURSE PRACTITIONER
Payer: MEDICARE

## 2023-08-17 DIAGNOSIS — Z12.39 SCREENING BREAST EXAMINATION: ICD-10-CM

## 2023-08-17 PROCEDURE — 77063 BREAST TOMOSYNTHESIS BI: CPT

## 2023-08-17 PROCEDURE — 77067 SCR MAMMO BI INCL CAD: CPT

## 2024-04-18 ENCOUNTER — TRANSCRIBE ORDERS (OUTPATIENT)
Dept: GENERAL RADIOLOGY | Facility: HOSPITAL | Age: 69
End: 2024-04-18
Payer: MEDICARE

## 2024-04-18 ENCOUNTER — HOSPITAL ENCOUNTER (OUTPATIENT)
Dept: GENERAL RADIOLOGY | Facility: HOSPITAL | Age: 69
Discharge: HOME | End: 2024-04-18
Admitting: NURSE PRACTITIONER
Payer: MEDICARE

## 2024-04-18 DIAGNOSIS — R52 PAIN: ICD-10-CM

## 2024-04-18 DIAGNOSIS — R52 PAIN: Primary | ICD-10-CM

## 2024-04-18 PROCEDURE — 73630 X-RAY EXAM OF FOOT: CPT

## 2024-04-19 ENCOUNTER — TRANSCRIBE ORDERS (OUTPATIENT)
Dept: ADMINISTRATIVE | Facility: HOSPITAL | Age: 69
End: 2024-04-19
Payer: MEDICARE

## 2024-04-19 DIAGNOSIS — Z78.0 POST-MENOPAUSAL: ICD-10-CM

## 2024-04-19 DIAGNOSIS — Z12.31 VISIT FOR SCREENING MAMMOGRAM: Primary | ICD-10-CM

## 2024-04-26 ENCOUNTER — OFFICE VISIT (OUTPATIENT)
Dept: PODIATRY | Facility: CLINIC | Age: 69
End: 2024-04-26
Payer: MEDICARE

## 2024-04-26 VITALS
WEIGHT: 163 LBS | HEART RATE: 58 BPM | TEMPERATURE: 98.2 F | BODY MASS INDEX: 28.88 KG/M2 | HEIGHT: 63 IN | SYSTOLIC BLOOD PRESSURE: 167 MMHG | DIASTOLIC BLOOD PRESSURE: 71 MMHG | OXYGEN SATURATION: 97 %

## 2024-04-26 DIAGNOSIS — M79.672 FOOT PAIN, LEFT: ICD-10-CM

## 2024-04-26 DIAGNOSIS — S92.355A CLOSED NONDISPLACED FRACTURE OF FIFTH METATARSAL BONE OF LEFT FOOT, INITIAL ENCOUNTER: Primary | ICD-10-CM

## 2024-04-26 RX ORDER — MULTIVIT-MIN/IRON/FOLIC ACID/K 18-600-40
CAPSULE ORAL
COMMUNITY

## 2024-04-26 RX ORDER — TRAZODONE HYDROCHLORIDE 50 MG/1
50 TABLET ORAL
COMMUNITY
Start: 2024-04-18

## 2024-04-26 NOTE — LETTER
April 26, 2024       No Recipients    Patient: Madeleine Campos   YOB: 1955   Date of Visit: 4/26/2024       Dear JUSTIN Skelton:    Thank you for referring Madeleine Campos to me for evaluation. Below are the relevant portions of my assessment and plan of care.    Encounter Diagnosis and Orders:  Diagnoses and all orders for this visit:    1. Closed nondisplaced fracture of fifth metatarsal bone of left foot, initial encounter (Primary)    2. Foot pain, left        If you have questions, please do not hesitate to call me. I look forward to following Madeleine along with you.         Sincerely,        Mason Nunez DPM        CC:   No Recipients

## 2024-04-26 NOTE — PROGRESS NOTES
[facility] - P[facility]IATRY    Today's Date: 04/26/24    Patient Name: Madeleine Campos  MRN: [id number]  CSN: [id number]  PCP: Belem Stephenson APRN  Referring Provider: Belem Stephenson APRN    SUBJECTIVE     Chief Complaint   Patient presents with    Left Foot - Establish Care     Fell after dog ran into her and knee buckled at least a week before xray  Xray on chart, 4/18/24  Pt wearing cam walker, pain is better      HPI: Madeleine Campos, a 68 y.o.female, presents to clinic.    New, Established, New Problem: New    Location: Left fifth metatarsal base    Duration: 18 April 2024    Onset: Cute, tripped by a dog    Nature: Sore    Stable, worsening, improving: Slowly improving    Aggravating factors:  Patient relates pain is aggravated by shoe gear and ambulation.      Previous Treatment: X-ray, CAM Walker    Patient denies any fevers, chills, nausea, vomiting, shortness of breath, nor any other constitutional signs nor symptoms.    No other pedal complaints at this time.    Past Medical History:   Diagnosis Date    Foot pain, left     Fracture of left foot     Hypertension     Sleep trouble     Thyroid disease      Past Surgical History:   Procedure Laterality Date    BACK SURGERY      CHOLECYSTECTOMY      HERNIA REPAIR      LAPAROSCOPIC TUBAL LIGATION       Family History   Family history unknown: Yes     Social History     Socioeconomic History    Marital status:    Tobacco Use    Smoking status: Former     Types: Cigarettes    Smokeless tobacco: Never   Vaping Use    Vaping status: Never Used   Substance and Sexual Activity    Alcohol use: Yes     Comment: occa    Drug use: Never    Sexual activity: Defer     Allergies   Allergen Reactions    Chlorthalidone Cough    Lisinopril Cough     Current Outpatient Medications   Medication Sig Dispense Refill    cetirizine (zyrTEC) 10 MG tablet cetirizine 10 mg tablet      Cholecalciferol 25 MCG (1000 UT) capsule       fluticasone (FLONASE)  50 MCG/ACT nasal spray Flonase Allergy Relief 50 mcg/actuation nasal spray,suspension   2 sprays in both nostrils bid      levothyroxine (SYNTHROID, LEVOTHROID) 25 MCG tablet Take 1 tablet by mouth Daily.      losartan (COZAAR) 50 MG tablet Take 1 tablet by mouth Daily.      omeprazole (priLOSEC) 20 MG capsule omeprazole 20 mg capsule,delayed release      traZODone (DESYREL) 50 MG tablet Take 1 tablet by mouth every night at bedtime.      Vitamin D, Cholecalciferol, 50 MCG (2000 UT) capsule Take  by mouth.       No current facility-administered medications for this visit.     Review of Systems   Constitutional: Negative.    Musculoskeletal:         Left fifth metatarsal base   All other systems reviewed and are negative.      OBJECTIVE     Vitals:    04/26/24 1020   BP: 167/71   Pulse: 58   Temp: 98.2 °F (36.8 °C)   SpO2: 97%       WBC   Date Value Ref Range Status   06/30/2020 4.85 4.80 - 10.80 10*3/uL Final     RBC   Date Value Ref Range Status   06/30/2020 4.25 4.20 - 5.40 10*6/uL Final     Hemoglobin   Date Value Ref Range Status   06/30/2020 12.9 12.0 - 16.0 g/dL Final     Hematocrit   Date Value Ref Range Status   06/30/2020 40.2 37.0 - 47.0 % Final     MCV   Date Value Ref Range Status   06/30/2020 94.6 81.0 - 99.0 fL Final     MCH   Date Value Ref Range Status   06/30/2020 30.4 27.0 - 31.0 pg Final     MCHC   Date Value Ref Range Status   06/30/2020 32.1 (L) 33.0 - 37.0 Final     RDW   Date Value Ref Range Status   06/30/2020 12.0 11.7 - 14.4 % Final     RDW-SD   Date Value Ref Range Status   06/30/2020 41.2 36.4 - 46.3 fL Final     MPV   Date Value Ref Range Status   06/30/2020 10.6 9.4 - 12.3 fL Final     Platelets   Date Value Ref Range Status   06/30/2020 237 130 - 400 10*3/uL Final     Neutrophil Rel %   Date Value Ref Range Status   06/30/2020 55.3 30.0 - 85.0 % Final     Lymphocyte Rel %   Date Value Ref Range Status   06/30/2020 32.6 20.0 - 45.0 % Final     Monocyte Rel %   Date Value Ref Range  Status   06/30/2020 10.1 (H) 3.0 - 10.0 % Final     Eosinophil Rel %   Date Value Ref Range Status   06/30/2020 1.4 0.0 - 7.0 % Final     Basophil Rel %   Date Value Ref Range Status   06/30/2020 0.4 0.0 - 3.0 % Final     Neutrophils Absolute   Date Value Ref Range Status   06/30/2020 2.68 2.00 - 8.00 10*3/uL Final     Lymphocytes Absolute   Date Value Ref Range Status   06/30/2020 1.58 1.00 - 5.00 10*3/uL Final     Monocytes Absolute   Date Value Ref Range Status   06/30/2020 0.49 0.20 - 1.20 10*3/uL Final     Eosinophils Absolute   Date Value Ref Range Status   06/30/2020 0.07 0.00 - 0.70 10*3/uL Final     Basophils Absolute   Date Value Ref Range Status   06/30/2020 0.02 0.00 - 0.20 10*3/uL Final     NRBC   Date Value Ref Range Status   06/30/2020 0.00 0.00 - 0.70 % Final         Lab Results   Component Value Date    GLUCOSE 95 06/30/2020    BUN 12 06/30/2020    CREATININE 0.56 06/30/2020    BCR 21 (H) 06/30/2020    K 4.4 06/30/2020    CO2 24 06/30/2020    CALCIUM 9.2 06/30/2020    ALBUMIN 4.4 06/30/2020    BILITOT 0.44 06/30/2020    AST 14 (L) 06/30/2020    ALT 25 06/30/2020       Patient seen in no apparent distress.      PHYSICAL EXAM:     Foot/Ankle Exam    GENERAL  Appearance:  appears stated age  Orientation:  AAOx3  Affect:  appropriate  Gait:  unimpaired  Assistance:  independent  Right shoe gear: casual shoe  Left shoe gear: CAM boot    VASCULAR     Right Foot Vascularity   Dorsalis pedis:  2+  Posterior tibial:  2+  Skin temperature:  warm  Edema grading:  None  CFT:  < 3 seconds  Pedal hair growth:  Absent  Varicosities:  mild varicosities     Left Foot Vascularity   Dorsalis pedis:  2+  Posterior tibial:  2+  Skin temperature:  warm  Edema grading:  None  CFT:  < 3 seconds  Pedal hair growth:  Absent  Varicosities:  mild varicosities     NEUROLOGIC     Right Foot Neurologic   Normal sensation    Light touch sensation: normal  Vibratory sensation: normal  Hot/Cold sensation: normal  Protective  Sensation using Guston-Jasmeet Monofilament:   Sites intact: 10  Sites tested: 10     Left Foot Neurologic   Normal sensation    Light touch sensation: normal  Vibratory sensation: normal  Hot/Cold sensation:  normal  Protective Sensation using Guston-Jasmeet Monofilament:   Sites intact: 10  Sites tested: 10    MUSCULOSKELETAL     Left Foot Musculoskeletal   Ecchymosis:  fifth metatarsal base  Tenderness:  fifth metatarsal base tenderness    MUSCLE STRENGTH     Right Foot Muscle Strength   Foot dorsiflexion:  4  Foot plantar flexion:  4  Foot inversion:  4  Foot eversion:  4     Left Foot Muscle Strength   Foot dorsiflexion:  4  Foot plantar flexion:  4  Foot inversion:  4  Foot eversion:  4    RANGE OF MOTION     Right Foot Range of Motion   Foot and ankle ROM within normal limits       Left Foot Range of Motion   Foot and ankle ROM within normal limits      DERMATOLOGIC      Right Foot Dermatologic   Skin  Right foot skin is intact.      Left Foot Dermatologic   Skin  Left foot skin is intact.       RADIOLOGY:      XR Foot 3+ View Left    Result Date: 4/18/2024  Narrative: XR FOOT 3+ VW LEFT-  Date of Exam: 4/18/2024 10:05 AM  Indication: PAIN; R52-Pain, unspecified  Comparison: None available.  Findings: There is an acute nondisplaced fracture involving the base of the fifth metatarsal. Fracture fragments remain in anatomic position. No other acute fracture or dislocation identified. No bony erosion or abnormal parosteal reaction. Soft tissues are unremarkable.      Impression: Impression:  1. Acute nondisplaced fracture involving the base of the fifth metatarsal.   Electronically Signed By-Tonio Perez MD On:4/18/2024 4:59 PM       ASSESSMENT/PLAN     Diagnoses and all orders for this visit:    1. Closed nondisplaced fracture of fifth metatarsal bone of left foot, initial encounter (Primary)    2. Foot pain, left        Comprehensive lower extremity examination and evaluation was performed.    Discussed  findings and treatment plan including risks, benefits, and treatment options with patient in detail. Patient agreed with treatment plan.    Medications and allergies reviewed.  Reviewed available lab values along with other pertinent labs.  These were discussed with the patient.    Patient continue CAM Walker on left foot at all times when ambulating.  Dr. Nunez advised patient may resume driving, but advised not to drive while wearing an ambulatory device.  Advised quick/hard depression of brakes could cause injury to areas.  Also advised of possible legal implications while driving in restrictive device.  The patient states understanding and agreement with these instructions.    Rice Therapy: It is important to treat any injury as soon as possible to help control swelling and increase recovery time. The recognized regimen for immediate treatment of sport injuries includes rest, ice (cold application), compression, and elevation (RICE). Remove the injured athlete from play, apply ice to the affected area, wrap or compress the injured area with an elastic bandage when appropriate, and elevate the injured area above heart level to reduce swelling.  The patient is to not use ice for longer than 20 minutes at a time, with at least 20 minutes of no ice usage between applications.  The patient states understanding and agreement with this plan.    Patient instructed use OTC analgesics with dosing per package insert as needed.  Patient states understanding and agreement with this plan.    An After Visit Summary was printed and given to the patient at discharge, including (if requested) any available informative/educational handouts regarding diagnosis, treatment, or medications. All questions were answered to patient/family satisfaction. Should symptoms fail to improve or worsen they agree to call or return to clinic or to go to the Emergency Department. Discussed the importance of following up with any needed screening  tests/labs/specialist appointments and any requested follow-up recommended by me today. Importance of maintaining follow-up discussed and patient accepts that missed appointments can delay diagnosis and potentially lead to worsening of conditions.    Return in about 4 weeks (around 5/24/2024) for X-ray needed., or sooner if acute issues arise.    This document has been electronically signed by Mason Nunez DPM on April 26, 2024 11:05 EDT

## 2024-05-23 ENCOUNTER — OFFICE VISIT (OUTPATIENT)
Dept: PODIATRY | Facility: CLINIC | Age: 69
End: 2024-05-23
Payer: MEDICARE

## 2024-05-23 VITALS
DIASTOLIC BLOOD PRESSURE: 76 MMHG | SYSTOLIC BLOOD PRESSURE: 164 MMHG | HEART RATE: 59 BPM | OXYGEN SATURATION: 95 % | BODY MASS INDEX: 28.88 KG/M2 | WEIGHT: 163 LBS | TEMPERATURE: 98.4 F | HEIGHT: 63 IN

## 2024-05-23 DIAGNOSIS — M79.672 FOOT PAIN, LEFT: ICD-10-CM

## 2024-05-23 DIAGNOSIS — S92.355A CLOSED NONDISPLACED FRACTURE OF FIFTH METATARSAL BONE OF LEFT FOOT, INITIAL ENCOUNTER: Primary | ICD-10-CM

## 2024-05-23 NOTE — PROGRESS NOTES
[facility] - PODIATRY    Today's Date: 05/23/24    Patient Name: Madeleine Campos  MRN: [id number]  CSN: [id number]  PCP: Belem Stephenson APRN  Referring Provider: No ref. provider found    SUBJECTIVE     Chief Complaint   Patient presents with    Left Foot - Follow-up, Fracture     HPI: Madeleine Campos, a 68 y.o.female, presents to clinic.    New, Established, New Problem:est    Location: Left fifth metatarsal base    Duration: 18 April 2024    Onset: Cute, tripped by a dog    Nature: Sore    Stable, worsening, improving: improving    Aggravating factors:  Patient relates pain is aggravated by shoe gear and ambulation.      Previous Treatment: X-ray, CAM Walker    Patient denies any fevers, chills, nausea, vomiting, shortness of breath, nor any other constitutional signs nor symptoms.    No other pedal complaints at this time.    I have reviewed/confirmed previously documented HPI with no changes.       Past Medical History:   Diagnosis Date    Foot pain, left     Fracture of left foot     Hypertension     Sleep trouble     Thyroid disease      Past Surgical History:   Procedure Laterality Date    BACK SURGERY      CHOLECYSTECTOMY      HERNIA REPAIR      LAPAROSCOPIC TUBAL LIGATION       Family History   Family history unknown: Yes     Social History     Socioeconomic History    Marital status:    Tobacco Use    Smoking status: Former     Types: Cigarettes    Smokeless tobacco: Never   Vaping Use    Vaping status: Never Used   Substance and Sexual Activity    Alcohol use: Yes     Comment: occa    Drug use: Never    Sexual activity: Defer     Allergies   Allergen Reactions    Chlorthalidone Cough    Lisinopril Cough     Current Outpatient Medications   Medication Sig Dispense Refill    cetirizine (zyrTEC) 10 MG tablet cetirizine 10 mg tablet      Cholecalciferol 25 MCG (1000 UT) capsule       fluticasone (FLONASE) 50 MCG/ACT nasal spray Flonase Allergy Relief 50 mcg/actuation  nasal spray,suspension   2 sprays in both nostrils bid      levothyroxine (SYNTHROID, LEVOTHROID) 25 MCG tablet Take 1 tablet by mouth Daily.      losartan (COZAAR) 50 MG tablet Take 1 tablet by mouth Daily.      omeprazole (priLOSEC) 20 MG capsule omeprazole 20 mg capsule,delayed release      traZODone (DESYREL) 50 MG tablet Take 1 tablet by mouth every night at bedtime.      Vitamin D, Cholecalciferol, 50 MCG (2000 UT) capsule Take  by mouth.       No current facility-administered medications for this visit.     Review of Systems   Constitutional: Negative.    Musculoskeletal:         F/U Left fifth metatarsal base   All other systems reviewed and are negative.      OBJECTIVE     Vitals:    05/23/24 0726   BP: 164/76   Pulse: 59   Temp: 98.4 °F (36.9 °C)   SpO2: 95%       WBC   Date Value Ref Range Status   06/30/2020 4.85 4.80 - 10.80 10*3/uL Final     RBC   Date Value Ref Range Status   06/30/2020 4.25 4.20 - 5.40 10*6/uL Final     Hemoglobin   Date Value Ref Range Status   06/30/2020 12.9 12.0 - 16.0 g/dL Final     Hematocrit   Date Value Ref Range Status   06/30/2020 40.2 37.0 - 47.0 % Final     MCV   Date Value Ref Range Status   06/30/2020 94.6 81.0 - 99.0 fL Final     MCH   Date Value Ref Range Status   06/30/2020 30.4 27.0 - 31.0 pg Final     MCHC   Date Value Ref Range Status   06/30/2020 32.1 (L) 33.0 - 37.0 Final     RDW   Date Value Ref Range Status   06/30/2020 12.0 11.7 - 14.4 % Final     RDW-SD   Date Value Ref Range Status   06/30/2020 41.2 36.4 - 46.3 fL Final     MPV   Date Value Ref Range Status   06/30/2020 10.6 9.4 - 12.3 fL Final     Platelets   Date Value Ref Range Status   06/30/2020 237 130 - 400 10*3/uL Final     Neutrophil Rel %   Date Value Ref Range Status   06/30/2020 55.3 30.0 - 85.0 % Final     Lymphocyte Rel %   Date Value Ref Range Status   06/30/2020 32.6 20.0 - 45.0 % Final     Monocyte Rel %   Date Value Ref Range Status   06/30/2020 10.1 (H) 3.0 - 10.0 % Final     Eosinophil  Rel %   Date Value Ref Range Status   06/30/2020 1.4 0.0 - 7.0 % Final     Basophil Rel %   Date Value Ref Range Status   06/30/2020 0.4 0.0 - 3.0 % Final     Neutrophils Absolute   Date Value Ref Range Status   06/30/2020 2.68 2.00 - 8.00 10*3/uL Final     Lymphocytes Absolute   Date Value Ref Range Status   06/30/2020 1.58 1.00 - 5.00 10*3/uL Final     Monocytes Absolute   Date Value Ref Range Status   06/30/2020 0.49 0.20 - 1.20 10*3/uL Final     Eosinophils Absolute   Date Value Ref Range Status   06/30/2020 0.07 0.00 - 0.70 10*3/uL Final     Basophils Absolute   Date Value Ref Range Status   06/30/2020 0.02 0.00 - 0.20 10*3/uL Final     NRBC   Date Value Ref Range Status   06/30/2020 0.00 0.00 - 0.70 % Final         Lab Results   Component Value Date    GLUCOSE 95 06/30/2020    BUN 12 06/30/2020    CREATININE 0.56 06/30/2020    BCR 21 (H) 06/30/2020    K 4.4 06/30/2020    CO2 24 06/30/2020    CALCIUM 9.2 06/30/2020    ALBUMIN 4.4 06/30/2020    BILITOT 0.44 06/30/2020    AST 14 (L) 06/30/2020    ALT 25 06/30/2020       Patient seen in no apparent distress.      PHYSICAL EXAM:     Foot/Ankle Exam    GENERAL  Appearance:  appears stated age  Orientation:  AAOx3  Affect:  appropriate  Gait:  unimpaired  Assistance:  independent  Right shoe gear: casual shoe  Left shoe gear: CAM boot    VASCULAR     Right Foot Vascularity   Dorsalis pedis:  2+  Posterior tibial:  2+  Skin temperature:  warm  Edema grading:  None  CFT:  < 3 seconds  Pedal hair growth:  Absent  Varicosities:  mild varicosities     Left Foot Vascularity   Dorsalis pedis:  2+  Posterior tibial:  2+  Skin temperature:  warm  Edema grading:  None  CFT:  < 3 seconds  Pedal hair growth:  Absent  Varicosities:  mild varicosities     NEUROLOGIC     Right Foot Neurologic   Normal sensation    Light touch sensation: normal  Vibratory sensation: normal  Hot/Cold sensation: normal  Protective Sensation using [place]-Jasmeet Monofilament:   Sites intact:  10  Sites tested: 10     Left Foot Neurologic   Normal sensation    Light touch sensation: normal  Vibratory sensation: normal  Hot/Cold sensation:  normal  Protective Sensation using [place]-Jasmeet Monofilament:   Sites intact: 10  Sites tested: 10    MUSCULOSKELETAL     Left Foot Musculoskeletal   Tenderness:  fifth metatarsal base tenderness (Minimal TTP)    MUSCLE STRENGTH     Right Foot Muscle Strength   Foot dorsiflexion:  4  Foot plantar flexion:  4  Foot inversion:  4  Foot eversion:  4     Left Foot Muscle Strength   Foot dorsiflexion:  4  Foot plantar flexion:  4  Foot inversion:  4  Foot eversion:  4    RANGE OF MOTION     Right Foot Range of Motion   Foot and ankle ROM within normal limits       Left Foot Range of Motion   Foot and ankle ROM within normal limits      DERMATOLOGIC      Right Foot Dermatologic   Skin  Right foot skin is intact.      Left Foot Dermatologic   Skin  Left foot skin is intact.       RADIOLOGY:      XR Foot 3+ View Left    Result Date: 5/23/2024  Narrative: IN-OFFICE IMAGING:  Standing, weightbearing, 3 view, AP, MO, Lateral, Left foot Indication: Fracture follow-up Findings: Healing nondisplaced fracture of the base of the fifth metatarsal shaft. Comparison: No other changes seen compared to previous views.     I have reexamined the patient the results are consistent with the previously documented exam.    ASSESSMENT/PLAN     Diagnoses and all orders for this visit:    1. Closed nondisplaced fracture of fifth metatarsal bone of left foot, initial encounter (Primary)    2. Foot pain, left        Comprehensive lower extremity examination and evaluation was performed.    Discussed findings and treatment plan including risks, benefits, and treatment options with patient in detail. Patient agreed with treatment plan.    Medications and allergies reviewed.  Reviewed available lab values along with other pertinent labs.  These were discussed with the patient.    Patient may begin to  weight bear as tolerated in supportive shoes.  No impact activities for one month.  After that time, the patient may increase activities as tolerated.  Patient states understanding and agreement with this plan.    Rice Therapy: It is important to treat any injury as soon as possible to help control swelling and increase recovery time. The recognized regimen for immediate treatment of sport injuries includes rest, ice (cold application), compression, and elevation (RICE). Remove the injured athlete from play, apply ice to the affected area, wrap or compress the injured area with an elastic bandage when appropriate, and elevate the injured area above heart level to reduce swelling.  The patient is to not use ice for longer than 20 minutes at a time, with at least 20 minutes of no ice usage between applications.  The patient states understanding and agreement with this plan.    Patient instructed use OTC analgesics with dosing per package insert as needed.  Patient states understanding and agreement with this plan.    An After Visit Summary was printed and given to the patient at discharge, including (if requested) any available informative/educational handouts regarding diagnosis, treatment, or medications. All questions were answered to patient/family satisfaction. Should symptoms fail to improve or worsen they agree to call or return to clinic or to go to the Emergency Department. Discussed the importance of following up with any needed screening tests/labs/specialist appointments and any requested follow-up recommended by me today. Importance of maintaining follow-up discussed and patient accepts that missed appointments can delay diagnosis and potentially lead to worsening of conditions.    Return if symptoms worsen or fail to improve., or sooner if acute issues arise.    This document has been electronically signed by Mason Nunez DPM on May 23, 2024 07:45 EDT

## 2024-08-19 ENCOUNTER — HOSPITAL ENCOUNTER (OUTPATIENT)
Dept: MAMMOGRAPHY | Facility: HOSPITAL | Age: 69
Discharge: HOME | End: 2024-08-19
Payer: MEDICARE

## 2024-08-19 ENCOUNTER — HOSPITAL ENCOUNTER (OUTPATIENT)
Dept: BONE DENSITY | Facility: HOSPITAL | Age: 69
Discharge: HOME | End: 2024-08-19
Payer: MEDICARE

## 2024-08-19 DIAGNOSIS — Z12.31 VISIT FOR SCREENING MAMMOGRAM: ICD-10-CM

## 2024-08-19 DIAGNOSIS — Z78.0 POST-MENOPAUSAL: ICD-10-CM

## 2024-08-19 PROCEDURE — 77067 SCR MAMMO BI INCL CAD: CPT

## 2024-08-19 PROCEDURE — 77080 DXA BONE DENSITY AXIAL: CPT

## 2024-08-19 PROCEDURE — 77063 BREAST TOMOSYNTHESIS BI: CPT

## 2025-02-24 ENCOUNTER — TRANSCRIBE ORDERS (OUTPATIENT)
Dept: ADMINISTRATIVE | Facility: HOSPITAL | Age: 70
End: 2025-02-24
Payer: MEDICAID

## 2025-02-24 DIAGNOSIS — Z12.31 VISIT FOR SCREENING MAMMOGRAM: Primary | ICD-10-CM

## 2025-08-20 ENCOUNTER — HOSPITAL ENCOUNTER (OUTPATIENT)
Dept: MAMMOGRAPHY | Facility: HOSPITAL | Age: 70
Discharge: HOME | End: 2025-08-20
Admitting: NURSE PRACTITIONER
Payer: MEDICARE

## 2025-08-20 DIAGNOSIS — Z12.31 VISIT FOR SCREENING MAMMOGRAM: ICD-10-CM

## 2025-08-20 PROCEDURE — 77067 SCR MAMMO BI INCL CAD: CPT

## 2025-08-20 PROCEDURE — 77063 BREAST TOMOSYNTHESIS BI: CPT
